# Patient Record
Sex: MALE | Race: OTHER
[De-identification: names, ages, dates, MRNs, and addresses within clinical notes are randomized per-mention and may not be internally consistent; named-entity substitution may affect disease eponyms.]

---

## 2019-01-30 ENCOUNTER — HOSPITAL ENCOUNTER (OUTPATIENT)
Dept: HOSPITAL 61 - RAD | Age: 69
Discharge: HOME | End: 2019-01-30
Attending: INTERNAL MEDICINE
Payer: MEDICARE

## 2019-01-30 DIAGNOSIS — R06.02: Primary | ICD-10-CM

## 2019-01-30 PROCEDURE — 71046 X-RAY EXAM CHEST 2 VIEWS: CPT

## 2019-01-30 NOTE — RAD
EXAM: PA and Lateral Views of the Chest

 

DATE: 1/30/2019 12:00 AM

 

INDICATION: shortness of breath

 

COMPARISON: Radiographs 10/26/2015

 

FINDINGS:

The heart is not enlarged. Aneurysmal dilatation of the aorta are seen 

with aortic arch measuring approximately 4.9 cm. Aorta is also tortuous.

 

Mediastinal and hilar contours are otherwise unremarkable.

 

No focal parenchymal airspace opacity.

 

No pleural effusion or pneumothorax.

 

 

 

IMPRESSION:

1.  Suspected aneurysmal dilatation of the tortuous aorta.

2.  Lungs are clear.

 

Electronically signed by: Farhan Fountain MD (1/30/2019 3:40 PM) [id number]

## 2019-04-10 ENCOUNTER — HOSPITAL ENCOUNTER (OUTPATIENT)
Dept: HOSPITAL 61 - SURG | Age: 69
Discharge: HOME | End: 2019-04-10
Attending: SURGERY
Payer: MEDICARE

## 2019-04-10 VITALS — HEIGHT: 68 IN | WEIGHT: 149 LBS | BODY MASS INDEX: 22.58 KG/M2

## 2019-04-10 VITALS
DIASTOLIC BLOOD PRESSURE: 82 MMHG | DIASTOLIC BLOOD PRESSURE: 82 MMHG | SYSTOLIC BLOOD PRESSURE: 131 MMHG | SYSTOLIC BLOOD PRESSURE: 131 MMHG

## 2019-04-10 DIAGNOSIS — Z80.1: ICD-10-CM

## 2019-04-10 DIAGNOSIS — Z79.899: ICD-10-CM

## 2019-04-10 DIAGNOSIS — I10: ICD-10-CM

## 2019-04-10 DIAGNOSIS — Z72.89: ICD-10-CM

## 2019-04-10 DIAGNOSIS — Z82.5: ICD-10-CM

## 2019-04-10 DIAGNOSIS — N40.0: ICD-10-CM

## 2019-04-10 DIAGNOSIS — K40.90: Primary | ICD-10-CM

## 2019-04-10 DIAGNOSIS — Z98.890: ICD-10-CM

## 2019-04-10 DIAGNOSIS — F17.210: ICD-10-CM

## 2019-04-10 PROCEDURE — C1781 MESH (IMPLANTABLE): HCPCS

## 2019-04-10 PROCEDURE — 49505 PRP I/HERN INIT REDUC >5 YR: CPT

## 2019-04-10 RX ADMIN — FENTANYL CITRATE PRN MCG: 50 INJECTION INTRAMUSCULAR; INTRAVENOUS at 13:45

## 2019-04-10 RX ADMIN — FENTANYL CITRATE PRN MCG: 50 INJECTION INTRAMUSCULAR; INTRAVENOUS at 13:03

## 2019-04-10 NOTE — DISCH
DISCHARGE INSTRUCTIONS


Condition on Discharge


Condition on Discharge:  Stable





Activity After Discharge


Activity Instructions for Disc:  Other, see below (no lifting over 20 lbs, 

strenuous activity)


Driving Instructions after Dis:  Other, see below (no driving while taking pain 

meds)





Diet after Discharge


Diet after Discharge:  Regular





Wound Incision Care


Wound/Incision Care:  Other, see below (keep dressing clean and dry X 72 hours, 

may then remove and shower)





Follow-Up


Follow up with:  Dr Rose in 2 weeks in office, call for appt [phone]











REMINGTON ROSE MD Apr 10, 2019 12:39

## 2019-04-10 NOTE — PDOC4
Operative Note


Operative Note


Operative Note:





Preoperative Diagnosis:  Right inguinal hernia





Postoperative Diagnosis: Same





Procedure: Right inguinal hernia with mesh





Surgeon: Jesus





Assistant: Milvia MACKAY





Anesthesia: Gen.





EBL: 10 mL





Specimen: None





Drains: None





Complications: None





Indication: The patient is a 69-year-old male who is referred with a right 

inguinal hernia. He was offered surgical repair. The risks of surgery were 

discussed which include bleeding, infection, recurrence, pain, anesthetic risk, 

potential need for additional surgery or procedure. He understands and would 

like to proceed.





Description:  The patient was taken to the operating room and placed supine on 

the operating table. Gen. anesthesia was performed. The right groin was shaved 

and prepped with ChloraPrep and draped in a standard surgical manner. An 

incision was made in the right groin with a scalpel. Cautery dissection was 

carried down to the external oblique aponeurosis. The aponeurosis was opened 

down to the external ring. The contents of the inguinal canal were digitally 

mobilized and encircled with a Penrose drain. The patient had a moderate sized 

indirect hernia sac present. The inguinal floor was relatively intact. The 

hernia sac was mobilized from the surrounding tissues and fully reduced. Care 

was taken to preserve the vas deferens and other cord structures. A large 

Phasix mesh plug was placed in the defect and secured around its periphery with 

2-0 Vicryl. The inguinal floor was reinforced with a keyhole Prolene mesh 

patch. The mesh was sutured around its periphery with 2-0 Vicryl and a slit was 

made to accommodate the cord structures. Upon completion the mesh rested well 

with full coverage of the inguinal floor and the plug remained intact deep to 

it. The external oblique was closed over the mesh with 2-0 Vicryl. The 

subcutaneous tissue was approximated with 3-0 Vicryl. The skin was closed with 4

-0 Monocryl. The incision was infiltrated with quarter percent Marcaine with 

epinephrine. Steri-Strips and dressings were applied. The patient tolerated the 

procedure well and was sent to the recovery room in stable condition. At the 

end of the case all counts were correct.











REMINGTON ROSE MD Apr 10, 2019 12:43

## 2019-10-02 ENCOUNTER — HOSPITAL ENCOUNTER (OUTPATIENT)
Dept: HOSPITAL 61 - US | Age: 69
Discharge: HOME | End: 2019-10-02
Attending: INTERNAL MEDICINE
Payer: MEDICARE

## 2019-10-02 DIAGNOSIS — M79.89: Primary | ICD-10-CM

## 2019-10-02 DIAGNOSIS — M79.605: ICD-10-CM

## 2019-10-02 PROCEDURE — 93971 EXTREMITY STUDY: CPT

## 2019-10-02 NOTE — RAD
EXAM: Left lower extremity venous Doppler sonogram.

 

HISTORY: Pain and swelling.

 

TECHNIQUE: Gray scale and color Doppler sonographic evaluation of the left

lower extremity veins with spectral waveform analysis was performed.

 

FINDINGS: There is normal color flow, normal compressibility and there are

normal spectral waveforms in the common femoral, superficial femoral, 

popliteal, posterior tibial and greater saphenous veins.

 

IMPRESSION: No Doppler evidence of lower extremity deep venous thrombosis.

 

Electronically signed by: Diana Bautista MD (10/2/2019 4:03 PM) [place] [id number]